# Patient Record
Sex: FEMALE | Race: WHITE | URBAN - METROPOLITAN AREA
[De-identification: names, ages, dates, MRNs, and addresses within clinical notes are randomized per-mention and may not be internally consistent; named-entity substitution may affect disease eponyms.]

---

## 2021-02-08 ENCOUNTER — OFFICE VISIT (OUTPATIENT)
Dept: PRIMARY CARE CLINIC | Age: 36
End: 2021-02-08
Payer: COMMERCIAL

## 2021-02-08 DIAGNOSIS — Z20.822 SUSPECTED COVID-19 VIRUS INFECTION: Primary | ICD-10-CM

## 2021-02-08 PROCEDURE — 99211 OFF/OP EST MAY X REQ PHY/QHP: CPT | Performed by: NURSE PRACTITIONER

## 2021-02-08 NOTE — PROGRESS NOTES
Christiana Nicolas received a viral test for COVID-19. They were educated on isolation and quarantine as appropriate. For any symptoms, they were directed to seek care from their PCP, given contact information to establish with a doctor, directed to an urgent care or the emergency room.

## 2021-02-09 LAB — SARS-COV-2, NAA: NOT DETECTED

## 2021-02-15 ENCOUNTER — OFFICE VISIT (OUTPATIENT)
Dept: PRIMARY CARE CLINIC | Age: 36
End: 2021-02-15
Payer: COMMERCIAL

## 2021-02-15 DIAGNOSIS — Z20.822 SUSPECTED COVID-19 VIRUS INFECTION: Primary | ICD-10-CM

## 2021-02-15 PROCEDURE — 99211 OFF/OP EST MAY X REQ PHY/QHP: CPT | Performed by: NURSE PRACTITIONER

## 2021-02-15 NOTE — PROGRESS NOTES
Diana Elysia received a viral test for COVID-19. They were educated on isolation and quarantine as appropriate. For any symptoms, they were directed to seek care from their PCP, given contact information to establish with a doctor, directed to an urgent care or the emergency room.

## 2021-02-16 LAB — SARS-COV-2: NOT DETECTED

## 2021-02-25 ENCOUNTER — OFFICE VISIT (OUTPATIENT)
Dept: PRIMARY CARE CLINIC | Age: 36
End: 2021-02-25
Payer: COMMERCIAL

## 2021-02-25 DIAGNOSIS — Z20.822 SUSPECTED COVID-19 VIRUS INFECTION: Primary | ICD-10-CM

## 2021-02-25 PROCEDURE — 99211 OFF/OP EST MAY X REQ PHY/QHP: CPT | Performed by: NURSE PRACTITIONER

## 2021-02-25 NOTE — PROGRESS NOTES
Felicita Henderson received a viral test for COVID-19. They were educated on isolation and quarantine as appropriate. For any symptoms, they were directed to seek care from their PCP, given contact information to establish with a doctor, directed to an urgent care or the emergency room.

## 2021-02-26 LAB — SARS-COV-2: NOT DETECTED

## 2023-01-24 ENCOUNTER — OFFICE VISIT (OUTPATIENT)
Dept: PRIMARY CARE CLINIC | Age: 38
End: 2023-01-24
Payer: COMMERCIAL

## 2023-01-24 VITALS
TEMPERATURE: 97.2 F | WEIGHT: 157.6 LBS | SYSTOLIC BLOOD PRESSURE: 110 MMHG | OXYGEN SATURATION: 99 % | DIASTOLIC BLOOD PRESSURE: 64 MMHG | HEIGHT: 64 IN | HEART RATE: 94 BPM | BODY MASS INDEX: 26.91 KG/M2

## 2023-01-24 DIAGNOSIS — M19.90 INFLAMMATORY ARTHROPATHY: ICD-10-CM

## 2023-01-24 DIAGNOSIS — Z17.1 MALIGNANT NEOPLASM OF UPPER-INNER QUADRANT OF LEFT BREAST IN FEMALE, ESTROGEN RECEPTOR NEGATIVE (HCC): ICD-10-CM

## 2023-01-24 DIAGNOSIS — Z01.818 PREOPERATIVE EXAMINATION: Primary | ICD-10-CM

## 2023-01-24 DIAGNOSIS — C50.212 MALIGNANT NEOPLASM OF UPPER-INNER QUADRANT OF LEFT BREAST IN FEMALE, ESTROGEN RECEPTOR NEGATIVE (HCC): ICD-10-CM

## 2023-01-24 PROCEDURE — 99213 OFFICE O/P EST LOW 20 MIN: CPT | Performed by: INTERNAL MEDICINE

## 2023-01-24 RX ORDER — ACETAMINOPHEN 325 MG/1
650 TABLET ORAL EVERY 4 HOURS PRN
COMMUNITY
Start: 2022-11-09 | End: 2023-01-24 | Stop reason: ALTCHOICE

## 2023-01-24 RX ORDER — LIDOCAINE AND PRILOCAINE 25; 25 MG/G; MG/G
CREAM TOPICAL
COMMUNITY
Start: 2022-08-07 | End: 2023-01-24 | Stop reason: ALTCHOICE

## 2023-01-24 RX ORDER — ONDANSETRON 4 MG/1
4 TABLET, FILM COATED ORAL EVERY 8 HOURS PRN
COMMUNITY
End: 2023-01-24 | Stop reason: ALTCHOICE

## 2023-01-24 RX ORDER — PROCHLORPERAZINE MALEATE 10 MG
10 TABLET ORAL EVERY 6 HOURS PRN
COMMUNITY
Start: 2022-08-07 | End: 2023-01-24

## 2023-01-24 RX ORDER — PREDNISONE 10 MG/1
TABLET ORAL
COMMUNITY
Start: 2023-01-10

## 2023-01-24 SDOH — ECONOMIC STABILITY: FOOD INSECURITY: WITHIN THE PAST 12 MONTHS, THE FOOD YOU BOUGHT JUST DIDN'T LAST AND YOU DIDN'T HAVE MONEY TO GET MORE.: NEVER TRUE

## 2023-01-24 SDOH — ECONOMIC STABILITY: FOOD INSECURITY: WITHIN THE PAST 12 MONTHS, YOU WORRIED THAT YOUR FOOD WOULD RUN OUT BEFORE YOU GOT MONEY TO BUY MORE.: NEVER TRUE

## 2023-01-24 ASSESSMENT — PATIENT HEALTH QUESTIONNAIRE - PHQ9
2. FEELING DOWN, DEPRESSED OR HOPELESS: 0
1. LITTLE INTEREST OR PLEASURE IN DOING THINGS: 0
SUM OF ALL RESPONSES TO PHQ QUESTIONS 1-9: 0
SUM OF ALL RESPONSES TO PHQ QUESTIONS 1-9: 0
SUM OF ALL RESPONSES TO PHQ9 QUESTIONS 1 & 2: 0
SUM OF ALL RESPONSES TO PHQ QUESTIONS 1-9: 0
SUM OF ALL RESPONSES TO PHQ QUESTIONS 1-9: 0

## 2023-01-24 ASSESSMENT — ENCOUNTER SYMPTOMS
SORE THROAT: 0
WHEEZING: 0
SINUS PRESSURE: 0
NAUSEA: 0
COUGH: 0
TROUBLE SWALLOWING: 0
RHINORRHEA: 0
ABDOMINAL PAIN: 0
DIARRHEA: 0
BLOOD IN STOOL: 0
SHORTNESS OF BREATH: 0
CHEST TIGHTNESS: 0
VOMITING: 0

## 2023-01-24 ASSESSMENT — SOCIAL DETERMINANTS OF HEALTH (SDOH): HOW HARD IS IT FOR YOU TO PAY FOR THE VERY BASICS LIKE FOOD, HOUSING, MEDICAL CARE, AND HEATING?: NOT HARD AT ALL

## 2023-01-24 NOTE — PROGRESS NOTES
Andra Gee   YOB: 1985    Date of Visit:  1/24/2023    Chief Complaint   Patient presents with    Pre-op Exam     LEFT BREAST SEED LOCALIZED LUMPECTOMY/ LEFT  SENTINEL NODE BIOPSY Ayo Acosta MD  General Surgery  NPI: [id number]  [address] [place] [address] Rd[address]  [address] [facility] [place] [address]     Phone: [phone]  Fax: [phone]         HPI  Patient presents for a preoperative history and physical exam.Patient is scheduled for a Left Breast Seed Localized Lumpectomy and Left Picacho Node Biopsy to be done on 1/30/23 by Dr. 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    Patient states she has completed 15 rounds of chemotherapy since August. Patient states she had a Keytruda infusion this morning. Patient states she had labs done for a CBC and CMP this morning. Patient has inflammatory arthritis due to [facility]. Patient states she takes Prednisone 10mg once daily. Patient sees Rheumatology. Review of Systems   Constitutional:  Positive for unexpected weight change (5 lbs since chemo). Negative for appetite change, chills and fever. HENT:  Negative for congestion, ear pain, postnasal drip, rhinorrhea, sinus pressure, sneezing, sore throat and trouble swallowing. Eyes:  Negative for visual disturbance. Respiratory:  Negative for cough, chest tightness, shortness of breath and wheezing. Cardiovascular:  Negative for chest pain, palpitations and leg swelling. Gastrointestinal:  Negative for abdominal pain, blood in stool, diarrhea, nausea and vomiting. Genitourinary:  Negative for dysuria, frequency and hematuria. Musculoskeletal:  Positive for arthralgias. Neurological:  Negative for dizziness, seizures, syncope, facial asymmetry, light-headedness, numbness and headaches. Hematological:  Negative for adenopathy. Does not bruise/bleed easily. Psychiatric/Behavioral:  Negative for dysphoric mood.       Past Medical History:   Diagnosis Date    Malignant neoplasm of upper-inner quadrant of left breast in female, estrogen receptor negative ([address] 1/25/2023       Past Surgical History:   Procedure Laterality Date    HERNIA REPAIR Left 1990    inguinal       Outpatient Medications Marked as Taking for the 1/24/23 encounter (Office Visit) with Briana Oliver MD   Medication Sig Dispense Refill    predniSONE (Kelsey Pavel) 10 MG tablet            No Known Allergies    Social History     Tobacco Use    Smoking status: Never    Smokeless tobacco: Never   Substance Use Topics    Alcohol use:  Yes     Alcohol/week: 2.0 standard drinks     Types: 2 Cans of beer per week       Family History   Problem Relation Age of Onset    Cancer Mother 39        breast diagnosed in early 35s    Diabetes Father     Thyroid Disease Father     Lupus Sister     Cancer Maternal Grandmother         lung    Heart Disease Maternal Grandfather     Diabetes Paternal Grandmother     Mental Illness Paternal Grandfather     Diabetes Paternal Grandfather     Cancer Paternal Aunt         uterine    Lupus Paternal Uncle        Immunization History   Administered Date(s) Administered    COVID-19, J&J, (age 18y+), IM, 0.5 mL 04/02/2021    COVID-19, PFIZER GRAY top, DO NOT Dilute, (age 15 y+), IM, 30 mcg/0.3 mL 12/13/2021    Hepatitis A 06/15/2015    Hepatitis B Ped/Adol (Engerix-B, Recombivax HB) 07/20/1999, 12/21/1999, 07/31/2000    Influenza Virus Vaccine 06/15/2015    Influenza, FLUARIX, FLULAVAL, FLUZONE (age 10 mo+) AND AFLURIA, (age 1 y+), PF, 0.5mL 11/08/2018    Influenza, FLUBLOK, (age 25 y+), PF, 0.5mL 09/27/2020, 12/13/2021    Influenza, FLUCELVAX, (age 10 mo+), MDCK, PF, 0.5mL 10/16/2019    Influenza, Triv, 3 Years and older, IM, PF (Afluria 5yrs and older) 01/10/2017    Meningococcal MPSV4 (Menomune) 07/19/2002    Td vaccine (adult) 07/20/1999    Tdap (Boostrix, Adacel) 05/11/2015    Typhoid Vaccine 07/02/2015    Typhoid Vi capsular polysaccharide (Typhim VI) 07/02/2015 Vitals:    01/24/23 1149   BP: 110/64   Site: Right Upper Arm   Position: Sitting   Cuff Size: Medium Adult   Pulse: 94   Temp: 97.2 °F (36.2 °C)   TempSrc: Temporal   SpO2: 99%   Weight: 157 lb 9.6 oz (71.5 kg)   Height: 5' 4\" (1.626 m)     Body mass index is 27.05 kg/m². Physical Exam  Nursing note reviewed. Constitutional:       General: She is not in acute distress. Appearance: Normal appearance. She is well-developed. HENT:      Head: Normocephalic and atraumatic. Right Ear: Tympanic membrane and ear canal normal.      Left Ear: Tympanic membrane and ear canal normal.      Mouth/Throat:      Pharynx: Oropharynx is clear. Eyes:      General: Lids are normal.      Extraocular Movements: Extraocular movements intact. Conjunctiva/sclera: Conjunctivae normal.      Pupils: Pupils are equal, round, and reactive to light. Neck:      Thyroid: No thyromegaly. Vascular: No carotid bruit. Cardiovascular:      Rate and Rhythm: Normal rate and regular rhythm. Heart sounds: Normal heart sounds, S1 normal and S2 normal. No murmur heard. No friction rub. No gallop. Pulmonary:      Effort: Pulmonary effort is normal. No respiratory distress. Breath sounds: Normal breath sounds. No wheezing, rhonchi or rales. Abdominal:      General: Bowel sounds are normal. There is no distension. Palpations: Abdomen is soft. Tenderness: There is no abdominal tenderness. Musculoskeletal:      Cervical back: Neck supple. Right lower leg: No edema. Left lower leg: No edema. Lymphadenopathy:      Head:      Right side of head: No submandibular adenopathy. Left side of head: No submandibular adenopathy. Neurological:      Mental Status: She is alert and oriented to person, place, and time. Cranial Nerves: No cranial nerve deficit. Deep Tendon Reflexes: Reflexes are normal and symmetric.    Psychiatric:         Mood and Affect: Mood normal.       Results - CMP (BAMP,TP,ALB,TBIL,ALK,AST,ALT) (01/24/2023 8:42 AM EST)  Component Value Ref Range Test Method Analysis Time Performed At [facility]   SODIUM 142 135 - 145 mEq/L     [facility] DIAGNOSTIC TESTING     POTASSIUM 3.8 3.6 - 5.1 mEq/L     [facility] DIAGNOSTIC TESTING     CHLORIDE 106 98 - 111 mEq/L     [facility] DIAGNOSTIC TESTING     CO2 26 21 - 31 mmol/L     [facility] DIAGNOSTIC TESTING     GLUCOSE, RANDOM 73 70 - 99 mg/dL     [facility] DIAGNOSTIC TESTING     BLD UREA NITROGEN 12 8 - 26 mg/dL     [facility] DIAGNOSTIC TESTING     CREATININE 0.74 0.60 - 1.20 mg/dL     [facility] DIAGNOSTIC TESTING     CALCIUM 9.8 8.5 - 10.4 mg/dL     [facility] DIAGNOSTIC TESTING     TOTAL PROTEIN 6.8 6.0 - 8.0 g/dL     [facility] DIAGNOSTIC TESTING     ALBUMIN 4.3 3.5 - 5.7 g/dL     [facility] DIAGNOSTIC TESTING     TOTAL BILIRUBIN 0.3 0.0 - 1.2 mg/dL     [facility] DIAGNOSTIC TESTING     ALK PHOSPHATASE 69 35 - 135 IU/L     [facility] DIAGNOSTIC TESTING     AST 16 10 - 40 IU/L     [facility] DIAGNOSTIC TESTING     ALT 22 10 - 60 IU/L     [facility] DIAGNOSTIC TESTING     ESTIMATED  >59 mL/min/1.73 m2     [facility] DIAGNOSTIC TESTING     Comment: Estimated GFR was calculated using the CKD-EPI cr (2021) equation refit without race. The equation is recommended by the [address] [place] [address] Nephrology [employer].    ANION GAP 10 6 - 18 mmol/L     [facility] DIAGNOSTIC TESTING     Comment: Tested at [address]       Results - (ABNORMAL) CBC WITH DIFFERENTIAL (01/24/2023 8:42 AM EST)  Component Value Ref Range Test Method Analysis Time Performed At [facility]   WBC 7.2 3.6 - 10.5 TH/[facility] DIAGNOSTIC TESTING     RBC 3.23 (L) 3.80 - 5.20 [facility] DIAGNOSTIC TESTING     HEMOGLOBIN 10.2 (L) 12.0 - 15.2 g/dL     [facility] DIAGNOSTIC TESTING     HEMATOCRIT 31.8 (L) 36 - 46 %     [facility] DIAGNOSTIC TESTING     MCV 98.6 (H) 82 - 97 fL     [facility] DIAGNOSTIC TESTING     MCH 31.7 27 - 33 pg     [facility] DIAGNOSTIC TESTING     MCHC 32.2 32 - 36 g/dL     [facility] DIAGNOSTIC TESTING     RDW 17.2 (H) 12.3 - 17.0 %     [facility] DIAGNOSTIC TESTING     PLATELET 893 670 - 061 THOU/[facility] DIAGNOSTIC TESTING     MPV 7.3 (L) 7.4 - 11.5 fL     [facility] DIAGNOSTIC TESTING     ABS. NEUTROPHIL 5.10 1.80 - 7.70 THOU/[facility] DIAGNOSTIC TESTING     ABS LYMPHS 1.20 1.00 - 4.00 THOU/[facility] DIAGNOSTIC TESTING     ABS MONOS 0.80 0.20 - 0.90 [facility]OU/[facility] DIAGNOSTIC TESTING     ABS EOS 0.00 (L) 0.03 - 0.45 [facility] DIAGNOSTIC TESTING     ABS BASOS 0.10 0.00 - 0.20 TH/[facility] DIAGNOSTIC TESTING     SEGS 70 %     [facility] DIAGNOSTIC TESTING     LYMPHOCYTES 16 %     [facility] DIAGNOSTIC TESTING     MONOCYTES 12 %     [facility] DIAGNOSTIC TESTING     EOSINOPHIL 1 %     [facility] DIAGNOSTIC TESTING     BASOPHILS 1 %     [facility] DIAGNOSTIC TESTING         Assessment/Plan     1. Preoperative examination  -Preoperative history and physical done   -Patient is in satisfactory condition for a  Left Breast Seed Localized Lumpectomy and Left Eola Node Biopsy to be done on 1/30/23 by Dr. Westley Gaitan   -Continue same medications  -Avoid anti-inflammatories, aspirin, and fish oil for 7 days prior to surgery  -Tylenol 650mg 3 times daily if needed for pain  -Nothing to eat or drink after midnight prior to surgery    2. Malignant neoplasm of upper-inner quadrant of left breast in female, estrogen receptor negative ([facility] [address]  -Preoperative history and physical done   -Patient is in satisfactory condition for a  Left Breast Seed Localized Lumpectomy and Left Eola Node Biopsy to be done on 1/30/23 by Dr. Westley Gaitan    3.  Inflammatory arthropathy  -stable  -continue Prednisone 10mg once daily prescribed by Rheumatology  -Continue care per Rheumatology        Return if symptoms worsen or fail to improve.

## 2023-01-25 PROBLEM — M19.90 INFLAMMATORY ARTHROPATHY: Status: ACTIVE | Noted: 2023-01-25

## 2023-01-25 PROBLEM — C50.212 MALIGNANT NEOPLASM OF UPPER-INNER QUADRANT OF LEFT BREAST IN FEMALE, ESTROGEN RECEPTOR NEGATIVE (HCC): Status: ACTIVE | Noted: 2023-01-25

## 2023-01-25 PROBLEM — Z17.1 MALIGNANT NEOPLASM OF UPPER-INNER QUADRANT OF LEFT BREAST IN FEMALE, ESTROGEN RECEPTOR NEGATIVE (HCC): Status: ACTIVE | Noted: 2023-01-25

## 2024-06-24 LAB — PAP SMEAR, EXTERNAL: NEGATIVE

## 2025-02-28 ENCOUNTER — OFFICE VISIT (OUTPATIENT)
Dept: PRIMARY CARE CLINIC | Age: 40
End: 2025-02-28

## 2025-02-28 VITALS
OXYGEN SATURATION: 99 % | WEIGHT: 181 LBS | HEIGHT: 65 IN | TEMPERATURE: 96.9 F | BODY MASS INDEX: 30.16 KG/M2 | SYSTOLIC BLOOD PRESSURE: 110 MMHG | HEART RATE: 75 BPM | RESPIRATION RATE: 16 BRPM | DIASTOLIC BLOOD PRESSURE: 80 MMHG

## 2025-02-28 DIAGNOSIS — Z13.1 SCREENING FOR DIABETES MELLITUS: ICD-10-CM

## 2025-02-28 DIAGNOSIS — F41.9 ANXIETY: ICD-10-CM

## 2025-02-28 DIAGNOSIS — Z00.00 ENCOUNTER FOR WELL ADULT EXAM WITHOUT ABNORMAL FINDINGS: Primary | ICD-10-CM

## 2025-02-28 DIAGNOSIS — Z11.59 NEED FOR HEPATITIS C SCREENING TEST: ICD-10-CM

## 2025-02-28 DIAGNOSIS — M19.90 INFLAMMATORY ARTHROPATHY: ICD-10-CM

## 2025-02-28 LAB
BILIRUBIN, POC: NORMAL
BLOOD URINE, POC: NORMAL
CLARITY, POC: CLEAR
COLOR, POC: YELLOW
GLUCOSE URINE, POC: NORMAL MG/DL
KETONES, POC: NORMAL MG/DL
LEUKOCYTE EST, POC: NORMAL
NITRITE, POC: NORMAL
PH, POC: 5
PROTEIN, POC: NORMAL MG/DL
SPECIFIC GRAVITY, POC: 1.02
UROBILINOGEN, POC: 0.2 MG/DL

## 2025-02-28 RX ORDER — HYDROXYCHLOROQUINE SULFATE 200 MG/1
200 TABLET, FILM COATED ORAL 2 TIMES DAILY
COMMUNITY
Start: 2025-01-22

## 2025-02-28 RX ORDER — VENLAFAXINE HYDROCHLORIDE 37.5 MG/1
37.5 CAPSULE, EXTENDED RELEASE ORAL DAILY
COMMUNITY
Start: 2024-12-19

## 2025-02-28 SDOH — ECONOMIC STABILITY: FOOD INSECURITY: WITHIN THE PAST 12 MONTHS, THE FOOD YOU BOUGHT JUST DIDN'T LAST AND YOU DIDN'T HAVE MONEY TO GET MORE.: NEVER TRUE

## 2025-02-28 SDOH — ECONOMIC STABILITY: FOOD INSECURITY: WITHIN THE PAST 12 MONTHS, YOU WORRIED THAT YOUR FOOD WOULD RUN OUT BEFORE YOU GOT MONEY TO BUY MORE.: NEVER TRUE

## 2025-02-28 ASSESSMENT — PATIENT HEALTH QUESTIONNAIRE - PHQ9
1. LITTLE INTEREST OR PLEASURE IN DOING THINGS: NOT AT ALL
SUM OF ALL RESPONSES TO PHQ QUESTIONS 1-9: 0
SUM OF ALL RESPONSES TO PHQ QUESTIONS 1-9: 0
2. FEELING DOWN, DEPRESSED OR HOPELESS: NOT AT ALL
SUM OF ALL RESPONSES TO PHQ QUESTIONS 1-9: 0
SUM OF ALL RESPONSES TO PHQ QUESTIONS 1-9: 0

## 2025-02-28 NOTE — PATIENT INSTRUCTIONS
hands, brush your teeth twice a day, and wear a seat belt in the car.   Where can you learn more?  Go to [email or web address]://[email or web address]/patientEd and enter P072 to learn more about \"Well Visit, Ages 18 to 65: Care Instructions.\"  Current as of: April 30, 2024  Content Version: 14.3  © 2024 [employer].   Care instructions adapted under license by [employer]. If you have questions about a medical condition or this instruction, always ask your healthcare professional. [employer] disclaims any warranty or liability for your use of this information.

## 2025-02-28 NOTE — PROGRESS NOTES
AFLURIA, (age 3 y+), Quadv PF, 0.5mL 11/08/2018    Influenza, FLUARIX, FLULAVAL, FLUZONE, (age 6 mo+), AFLURIA, (age 3 y+), IM, Trivalent PF, 0.5mL 01/10/2017    Influenza, FLUBLOK, (age 18 y+), Quadv PF, 0.5mL 09/27/2020, 12/13/2021    Influenza, FLUCELVAX, (age 6 mo+) IM, Trivalent PF, 0.5mL 09/15/2024    Influenza, FLUCELVAX, (age 6 mo+), MDCK, Quadv PF, 0.5mL 10/16/2019    Meningococcal MPSV4 (Menomune) 07/19/2002    TDaP, ADACEL (age 10y-64y), BOOSTRIX (age 10y+), IM, 0.5mL 05/11/2015    Td vaccine (adult) 07/20/1999    Typhoid Vaccine 07/02/2015    Typhoid Vi capsular polysaccharide (Typhim VI) 07/02/2015        Health Maintenance Due   Topic Date Due    Varicella vaccine (1 of 2 - 13+ 2-dose series) Never done    HIV screen  Never done    Hepatitis C screen  Never done      Recommendations for Preventive Services Due: see orders and patient instructions/AVS.    The patient (or guardian, if applicable) and other individuals in attendance with the patient were advised that Artificial Intelligence will be utilized during this visit to record and process the conversation to generate a clinical note. The patient (or guardian, if applicable) and other individuals in attendance at the appointment consented to the use of AI, including the recording.

## 2025-03-07 PROBLEM — Z11.59 NEED FOR HEPATITIS C SCREENING TEST: Status: ACTIVE | Noted: 2025-03-07

## 2025-03-07 PROBLEM — F41.9 ANXIETY: Status: ACTIVE | Noted: 2025-03-07

## 2025-03-07 PROBLEM — Z13.1 SCREENING FOR DIABETES MELLITUS: Status: ACTIVE | Noted: 2025-03-07

## 2025-03-07 PROBLEM — Z00.00 ENCOUNTER FOR WELL ADULT EXAM WITHOUT ABNORMAL FINDINGS: Status: ACTIVE | Noted: 2025-03-07

## 2025-03-07 ASSESSMENT — ENCOUNTER SYMPTOMS
COUGH: 0
TROUBLE SWALLOWING: 0
CHOKING: 0
ABDOMINAL PAIN: 0
EYE REDNESS: 0
ABDOMINAL DISTENTION: 0
NAUSEA: 0
VOICE CHANGE: 0
SORE THROAT: 0
SINUS PAIN: 0
BLOOD IN STOOL: 0
SHORTNESS OF BREATH: 0
EYE PAIN: 0
EYE DISCHARGE: 0
CONSTIPATION: 0
ANAL BLEEDING: 0
FACIAL SWELLING: 0
PHOTOPHOBIA: 0
RHINORRHEA: 0
APNEA: 0
CHEST TIGHTNESS: 0
RECTAL PAIN: 0
EYE ITCHING: 0
DIARRHEA: 0
SINUS PRESSURE: 0
BACK PAIN: 0
VOMITING: 0
WHEEZING: 0

## 2025-04-06 PROBLEM — Z00.00 ENCOUNTER FOR WELL ADULT EXAM WITHOUT ABNORMAL FINDINGS: Status: RESOLVED | Noted: 2025-03-07 | Resolved: 2025-04-06

## 2025-04-06 PROBLEM — Z11.59 NEED FOR HEPATITIS C SCREENING TEST: Status: RESOLVED | Noted: 2025-03-07 | Resolved: 2025-04-06

## 2025-04-06 PROBLEM — Z13.1 SCREENING FOR DIABETES MELLITUS: Status: RESOLVED | Noted: 2025-03-07 | Resolved: 2025-04-06

## 2025-07-21 LAB
ALBUMIN: 3.8 G/DL (ref 3.5–5.7)
ALP BLD-CCNC: 79 IU/L (ref 35–135)
ALT SERPL-CCNC: 12 IU/L (ref 10–60)
ANION GAP SERPL CALCULATED.3IONS-SCNC: 7 MMOL/L (ref 4–16)
AST SERPL-CCNC: 19 IU/L (ref 10–40)
BASOPHILS ABSOLUTE: 0 THOU/MCL (ref 0–0.2)
BASOPHILS ABSOLUTE: 1 %
BILIRUB SERPL-MCNC: 0.3 MG/DL (ref 0–1.2)
BUN BLDV-MCNC: 11 MG/DL (ref 8–26)
CALCIUM SERPL-MCNC: 8.7 MG/DL (ref 8.5–10.4)
CHLORIDE BLD-SCNC: 109 MMOL/L (ref 98–111)
CHOLESTEROL, TOTAL: 188 MG/DL
CO2: 26 MMOL/L (ref 21–31)
CREAT SERPL-MCNC: 0.86 MG/DL (ref 0.6–1.2)
EGFR (CKD-EPI): 87 ML/MIN/1.73 M2
EOSINOPHILS ABSOLUTE: 0.2 THOU/MCL (ref 0.03–0.45)
EOSINOPHILS RELATIVE PERCENT: 4 %
ESTIMATED AVERAGE GLUCOSE: 100 MG/DL
GLUCOSE BLD-MCNC: 93 MG/DL (ref 70–99)
HBA1C MFR BLD: 5.1 % (ref 4.2–5.6)
HCT VFR BLD CALC: 37.3 % (ref 36–46)
HDLC SERPL-MCNC: 44 MG/DL
HEMOGLOBIN: 12.5 G/DL (ref 12–15.2)
HEPATITIS C VIRUS RNA SER/PLAS NCNC: NONREACTIVE
LDL CHOLESTEROL: 109 MG/DL
LYMPHOCYTES ABSOLUTE: 1.4 THOU/MCL (ref 1–4)
LYMPHOCYTES RELATIVE PERCENT: 29 %
MCH RBC QN AUTO: 31.5 PG (ref 27–33)
MCHC RBC AUTO-ENTMCNC: 33.6 G/DL (ref 32–36)
MCV RBC AUTO: 93.8 FL (ref 82–97)
MONOCYTES ABSOLUTE: 0.4 THOU/MCL (ref 0.2–0.9)
MONOCYTES RELATIVE PERCENT: 8 %
NEUTROPHILS ABSOLUTE: 2.9 THOU/MCL (ref 1.8–7.7)
NONHDLC SERPL-MCNC: 144 MG/DL
PDW BLD-RTO: 13.1 % (ref 12.3–17)
PLATELET # BLD: 248 THOU/MCL (ref 140–375)
PMV BLD AUTO: 7.7 FL (ref 7–11.5)
POTASSIUM SERPL-SCNC: 4 MMOL/L (ref 3.6–5.1)
RBC # BLD: 3.97 MIL/MCL (ref 3.8–5.2)
SEG NEUTROPHILS: 58 %
SODIUM BLD-SCNC: 142 MMOL/L (ref 135–145)
TOTAL PROTEIN: 6.7 G/DL (ref 6–8)
TRIGL SERPL-MCNC: 173 MG/DL
TSH ULTRASENSITIVE: 2.67 MCIU/ML (ref 0.27–4.2)
WBC # BLD: 4.8 THOU/MCL (ref 3.6–10.5)